# Patient Record
Sex: MALE | NOT HISPANIC OR LATINO | Employment: UNEMPLOYED | URBAN - METROPOLITAN AREA
[De-identification: names, ages, dates, MRNs, and addresses within clinical notes are randomized per-mention and may not be internally consistent; named-entity substitution may affect disease eponyms.]

---

## 2019-03-18 ENCOUNTER — OFFICE VISIT (OUTPATIENT)
Dept: MEDICAL GROUP | Facility: MEDICAL CENTER | Age: 29
End: 2019-03-18

## 2019-03-18 VITALS
HEART RATE: 69 BPM | HEIGHT: 70 IN | WEIGHT: 188 LBS | BODY MASS INDEX: 26.92 KG/M2 | TEMPERATURE: 98.4 F | SYSTOLIC BLOOD PRESSURE: 118 MMHG | DIASTOLIC BLOOD PRESSURE: 72 MMHG | OXYGEN SATURATION: 98 %

## 2019-03-18 DIAGNOSIS — F32.0 CURRENT MILD EPISODE OF MAJOR DEPRESSIVE DISORDER WITHOUT PRIOR EPISODE (HCC): ICD-10-CM

## 2019-03-18 DIAGNOSIS — F90.0 ATTENTION DEFICIT HYPERACTIVITY DISORDER (ADHD), PREDOMINANTLY INATTENTIVE TYPE: ICD-10-CM

## 2019-03-18 DIAGNOSIS — F41.9 ANXIETY: ICD-10-CM

## 2019-03-18 DIAGNOSIS — Z00.00 ANNUAL PHYSICAL EXAM: ICD-10-CM

## 2019-03-18 PROCEDURE — 99385 PREV VISIT NEW AGE 18-39: CPT | Performed by: FAMILY MEDICINE

## 2019-03-18 RX ORDER — METHYLPHENIDATE HYDROCHLORIDE 10 MG/1
10 TABLET ORAL 3 TIMES DAILY
Qty: 90 TAB | Refills: 0 | Status: SHIPPED | OUTPATIENT
Start: 2019-03-18 | End: 2019-04-25 | Stop reason: SDUPTHER

## 2019-03-18 RX ORDER — ESCITALOPRAM OXALATE 10 MG/1
10 TABLET ORAL DAILY
Qty: 30 TAB | Refills: 0 | Status: SHIPPED | OUTPATIENT
Start: 2019-03-18 | End: 2019-04-25 | Stop reason: SDUPTHER

## 2019-03-18 ASSESSMENT — PATIENT HEALTH QUESTIONNAIRE - PHQ9
5. POOR APPETITE OR OVEREATING: 0 - NOT AT ALL
CLINICAL INTERPRETATION OF PHQ2 SCORE: 1
SUM OF ALL RESPONSES TO PHQ QUESTIONS 1-9: 5

## 2019-03-19 NOTE — ASSESSMENT & PLAN NOTE
Patient has a history of depression.  He states that his worst symptoms were lack of motivation, difficulty getting out of bed, suicidal thoughts, however he never acted on these thoughts.  No suicidal attempts.  No hospitalizations.  No current suicidal thoughts.  Depression is well controlled with Lexapro 10 mg daily.  He is requesting a refill to get him back to [place] in 1 month, where he lives permanently.  He is here visiting his parents.

## 2019-03-19 NOTE — ASSESSMENT & PLAN NOTE
Diagnosed in 2011 by a psychiatrist in [facility].  He has been taking Ritalin 10 mg 2-3 times daily.  He is requesting a refill.  He will be traveling back to [place] in about 1 month.

## 2019-03-19 NOTE — PROGRESS NOTES
Bruno Baires is a 29 y.o. male here for annual  HPI: Patient lives in [facility].    Attention deficit hyperactivity disorder (ADHD), predominantly inattentive type  Diagnosed in 2011 by a psychiatrist in [facility].  He has been taking Ritalin 10 mg 2-3 times daily.  He is requesting a refill.  He will be traveling back to [place] in about 1 month.    Anxiety  Patient is currently well controlled with Lexapro 10 mg daily.    Current mild episode of major depressive disorder without prior episode (HCC)  Patient has a history of depression.  He states that his worst symptoms were lack of motivation, difficulty getting out of bed, suicidal thoughts, however he never acted on these thoughts.  No suicidal attempts.  No hospitalizations.  No current suicidal thoughts.  Depression is well controlled with Lexapro 10 mg daily.  He is requesting a refill to get him back to [place] in 1 month, where he lives permanently.  He is here visiting his parents.    Current medicines (including changes today)  Current Outpatient Prescriptions   Medication Sig Dispense Refill   • escitalopram (LEXAPRO) 10 MG Tab Take 1 Tab by mouth every day. 30 Tab 0   • methylphenidate (RITALIN) 10 MG Tab Take 1 Tab by mouth 3 times a day for 30 days. 90 Tab 0     No current facility-administered medications for this visit.      He  has a past medical history of Anxiety and Tremors of nervous system.  He  has a past surgical history that includes eye surgery.  Social History   Substance Use Topics   • Smoking status: Never Smoker   • Smokeless tobacco: Never Used   • Alcohol use 1.8 oz/week     3 Cans of beer per week     Social History     Social History Narrative   • No narrative on file     Family History   Problem Relation Age of Onset   • Hyperlipidemia Mother    • Hypertension Mother    • Cancer Maternal Grandmother         Breast   • Diabetes Maternal Grandmother      Family Status   Relation Status   • Mo Alive  "  • Fa Alive   • MGMo          ROS  No chest pain, no shortness of breath  All other systems reviewed and are negative     Objective:     Blood pressure 118/72, pulse 69, temperature 36.9 °C (98.4 °F), height 1.778 m (5' 10\"), weight 85.3 kg (188 lb), SpO2 98 %. Body mass index is 26.98 kg/m².  Physical Exam:    Constitutional: Alert, no distress.  Skin: Warm, dry, good turgor, no rashes in visible areas.  Eye: Equal, round and reactive, conjunctiva clear, lids normal.  ENMT: Lips without lesions, good dentition, oropharynx clear.  Neck: Trachea midline, no masses, no thyromegaly. No cervical or supraclavicular lymphadenopathy.  Respiratory: Unlabored respiratory effort, lungs clear to auscultation, no wheezes, no ronchi.  Cardiovascular: Normal S1, S2, no murmur, no edema.  Abdomen: Soft, non-tender, no masses, no hepatosplenomegaly.  Psych: Alert and oriented x3, normal affect and mood.        Assessment and Plan:   The following treatment plan was discussed    1. Annual physical exam  Patient believes he is up-to-date on his immunizations but will check with his primary care provider in [place].    2. Current mild episode of major depressive disorder without prior episode (HCC)  Controlled.  Refill Lexapro 10 mg daily.    3. Anxiety  Controlled.  Refill Lexapro 10 mg daily.  - escitalopram (LEXAPRO) 10 MG Tab; Take 1 Tab by mouth every day.  Dispense: 30 Tab; Refill: 0    4. Attention deficit hyperactivity disorder (ADHD), predominantly inattentive type  Controlled.  Refill Ritalin 10 mg up to 3 times daily.  - methylphenidate (RITALIN) 10 MG Tab; Take 1 Tab by mouth 3 times a day for 30 days.  Dispense: 90 Tab; Refill: 0      Followup: Return in about 1 year (around 3/18/2020) for Annual.           "

## 2019-04-25 ENCOUNTER — OFFICE VISIT (OUTPATIENT)
Dept: MEDICAL GROUP | Facility: MEDICAL CENTER | Age: 29
End: 2019-04-25

## 2019-04-25 VITALS
OXYGEN SATURATION: 94 % | BODY MASS INDEX: 26.92 KG/M2 | WEIGHT: 188 LBS | SYSTOLIC BLOOD PRESSURE: 128 MMHG | HEIGHT: 70 IN | DIASTOLIC BLOOD PRESSURE: 74 MMHG | TEMPERATURE: 97.9 F | HEART RATE: 76 BPM

## 2019-04-25 DIAGNOSIS — F90.0 ATTENTION DEFICIT HYPERACTIVITY DISORDER (ADHD), PREDOMINANTLY INATTENTIVE TYPE: ICD-10-CM

## 2019-04-25 DIAGNOSIS — F41.9 ANXIETY: ICD-10-CM

## 2019-04-25 DIAGNOSIS — F51.01 PRIMARY INSOMNIA: ICD-10-CM

## 2019-04-25 PROCEDURE — 99213 OFFICE O/P EST LOW 20 MIN: CPT | Performed by: FAMILY MEDICINE

## 2019-04-25 RX ORDER — METHYLPHENIDATE HYDROCHLORIDE 10 MG/1
10 TABLET ORAL 3 TIMES DAILY
Qty: 90 TAB | Refills: 0 | Status: SHIPPED | OUTPATIENT
Start: 2019-04-25 | End: 2019-04-25 | Stop reason: SDUPTHER

## 2019-04-25 RX ORDER — DIAZEPAM 5 MG/1
5 TABLET ORAL
Qty: 30 TAB | Refills: 5 | Status: SHIPPED | OUTPATIENT
Start: 2019-04-25 | End: 2019-05-25

## 2019-04-25 RX ORDER — ZOLPIDEM TARTRATE 5 MG/1
5 TABLET ORAL NIGHTLY PRN
Qty: 30 TAB | Refills: 5 | Status: SHIPPED | OUTPATIENT
Start: 2019-04-25 | End: 2019-05-25

## 2019-04-25 RX ORDER — METHYLPHENIDATE HYDROCHLORIDE 10 MG/1
10 TABLET ORAL 3 TIMES DAILY
Qty: 90 TAB | Refills: 0 | Status: SHIPPED | OUTPATIENT
Start: 2019-05-23 | End: 2019-04-25 | Stop reason: SDUPTHER

## 2019-04-25 RX ORDER — ESCITALOPRAM OXALATE 10 MG/1
10 TABLET ORAL DAILY
Qty: 90 TAB | Refills: 1 | Status: SHIPPED | OUTPATIENT
Start: 2019-04-25 | End: 2020-02-03 | Stop reason: SDUPTHER

## 2019-04-25 RX ORDER — METHYLPHENIDATE HYDROCHLORIDE 10 MG/1
10 TABLET ORAL 3 TIMES DAILY
Qty: 90 TAB | Refills: 0 | Status: SHIPPED | OUTPATIENT
Start: 2019-06-20 | End: 2019-07-20

## 2019-04-25 NOTE — PROGRESS NOTES
"Subjective:   Bruno Baires is a 29 y.o. male here today for anxiety and ADHD    Patient is having increased anxiety because he is currently looking for a new job.  Is a job interview in a week to possibly get a job in [facility].  Patient is requesting a refill on his Lexapro, diazepam and Ambien to help him sleep.  He is also requesting a refill on Ritalin that he has been using 2-3 times per day.    Current medicines (including changes today)  Current Outpatient Prescriptions   Medication Sig Dispense Refill   • escitalopram (LEXAPRO) 10 MG Tab Take 1 Tab by mouth every day. 90 Tab 1   • zolpidem (AMBIEN) 5 MG Tab Take 1 Tab by mouth at bedtime as needed for Sleep for up to 30 days. 30 Tab 5   • [START ON 6/20/2019] methylphenidate (RITALIN) 10 MG Tab Take 1 Tab by mouth 3 times a day for 30 days. 90 Tab 0   • diazePAM (VALIUM) 5 MG Tab Take 1 Tab by mouth 1 time daily as needed for Anxiety for up to 30 days. 30 Tab 5     No current facility-administered medications for this visit.      He  has a past medical history of Anxiety and Tremors of nervous system.    ROS   No fever       Objective:     /74 (BP Location: Right arm, Patient Position: Sitting)   Pulse 76   Temp 36.6 °C (97.9 °F)   Ht 1.778 m (5' 10\")   Wt 85.3 kg (188 lb)   SpO2 94%  Body mass index is 26.98 kg/m².   Physical Exam:  Constitutional: Alert, no distress.  Skin: Warm, dry, good turgor, no rashes in visible areas.  Eye: Equal, round and reactive, conjunctiva clear, lids normal.  Psych: Alert and oriented x3, normal affect and mood.        Assessment and Plan:   The following treatment plan was discussed    1. Attention deficit hyperactivity disorder (ADHD), predominantly inattentive type  Stable.  Refill Ritalin for 3 months.  Follow-up if he is still living in the area.  - methylphenidate (RITALIN) 10 MG Tab; Take 1 Tab by mouth 3 times a day for 30 days.  Dispense: 90 Tab; Refill: 0    2. Anxiety  Uncontrolled.  Continue " Lexapro and we will refill diazepam.  - escitalopram (LEXAPRO) 10 MG Tab; Take 1 Tab by mouth every day.  Dispense: 90 Tab; Refill: 1  - diazePAM (VALIUM) 5 MG Tab; Take 1 Tab by mouth 1 time daily as needed for Anxiety for up to 30 days.  Dispense: 30 Tab; Refill: 5    3. Primary insomnia  Uncontrolled.  Restart Ambien.  - zolpidem (AMBIEN) 5 MG Tab; Take 1 Tab by mouth at bedtime as needed for Sleep for up to 30 days.  Dispense: 30 Tab; Refill: 5      Followup: Return if symptoms worsen or fail to improve.

## 2019-11-10 ENCOUNTER — OFFICE VISIT (OUTPATIENT)
Dept: URGENT CARE | Facility: CLINIC | Age: 29
End: 2019-11-10

## 2019-11-10 VITALS
SYSTOLIC BLOOD PRESSURE: 120 MMHG | WEIGHT: 174.6 LBS | HEART RATE: 100 BPM | TEMPERATURE: 98.7 F | BODY MASS INDEX: 25.05 KG/M2 | OXYGEN SATURATION: 98 % | DIASTOLIC BLOOD PRESSURE: 82 MMHG | RESPIRATION RATE: 12 BRPM

## 2019-11-10 DIAGNOSIS — R05.9 COUGH: ICD-10-CM

## 2019-11-10 DIAGNOSIS — J01.90 ACUTE BACTERIAL SINUSITIS: ICD-10-CM

## 2019-11-10 DIAGNOSIS — B96.89 ACUTE BACTERIAL SINUSITIS: ICD-10-CM

## 2019-11-10 PROCEDURE — 99214 OFFICE O/P EST MOD 30 MIN: CPT | Performed by: NURSE PRACTITIONER

## 2019-11-10 RX ORDER — AMOXICILLIN AND CLAVULANATE POTASSIUM 875; 125 MG/1; MG/1
1 TABLET, FILM COATED ORAL 2 TIMES DAILY
Qty: 14 TAB | Refills: 0 | Status: SHIPPED | OUTPATIENT
Start: 2019-11-10 | End: 2019-11-17

## 2019-11-10 RX ORDER — BENZONATATE 100 MG/1
100 CAPSULE ORAL 3 TIMES DAILY PRN
Qty: 60 CAP | Refills: 0 | Status: SHIPPED
Start: 2019-11-10 | End: 2020-02-03

## 2019-11-10 ASSESSMENT — ENCOUNTER SYMPTOMS
COUGH: 1
HEADACHES: 1
CHILLS: 1
SINUS PRESSURE: 1
SINUS PAIN: 1
SORE THROAT: 1

## 2019-11-10 NOTE — PROGRESS NOTES
Subjective:      Bruno Baires is a 29 y.o. male who presents with URI (x 1 WEEK RESPIRTORY INFECTION )            Sinus Problem   This is a new problem. Episode onset: Pt is accompanied by his mother. Pt reports new onset of sinus congestion, sinus pain and coughing that started 4 days ago. He states symptoms are getting much worse despite OTC therapies. No recent fever, feels chills. The problem has been gradually worsening since onset. Associated symptoms include chills, congestion, coughing, headaches, sinus pressure and a sore throat. Past treatments include oral decongestants and spray decongestants. The treatment provided mild relief.       Review of Systems   Constitutional: Positive for chills.   HENT: Positive for congestion, sinus pressure, sinus pain and sore throat.    Respiratory: Positive for cough.    Neurological: Positive for headaches.   All other systems reviewed and are negative.    Past Medical History:   Diagnosis Date   • Anxiety    • Tremors of nervous system       Past Surgical History:   Procedure Laterality Date   • EYE SURGERY      PRK x2 in each eye      Social History     Socioeconomic History   • Marital status: Single     Spouse name: Not on file   • Number of children: Not on file   • Years of education: Not on file   • Highest education level: Not on file   Occupational History   • Not on file   Social Needs   • Financial resource strain: Not on file   • Food insecurity:     Worry: Not on file     Inability: Not on file   • Transportation needs:     Medical: Not on file     Non-medical: Not on file   Tobacco Use   • Smoking status: Never Smoker   • Smokeless tobacco: Never Used   Substance and Sexual Activity   • Alcohol use: Yes     Alcohol/week: 1.8 oz     Types: 3 Cans of beer per week   • Drug use: No   • Sexual activity: Not Currently   Lifestyle   • Physical activity:     Days per week: Not on file     Minutes per session: Not on file   • Stress: Not on file   Relationships    • Social connections:     Talks on phone: Not on file     Gets together: Not on file     Attends Worship service: Not on file     Active member of club or organization: Not on file     Attends meetings of clubs or organizations: Not on file     Relationship status: Not on file   • Intimate partner violence:     Fear of current or ex partner: Not on file     Emotionally abused: Not on file     Physically abused: Not on file     Forced sexual activity: Not on file   Other Topics Concern   • Not on file   Social History Narrative   • Not on file          Objective:     /82 (BP Location: Left arm, Patient Position: Sitting, BP Cuff Size: Adult)   Pulse 100   Temp 37.1 °C (98.7 °F) (Temporal)   Resp 12   Wt 79.2 kg (174 lb 9.6 oz)   SpO2 98%   BMI 25.05 kg/m²      Physical Exam  Vitals signs and nursing note reviewed.   Constitutional:       General: He is awake.      Appearance: Normal appearance. He is normal weight.   HENT:      Head: Normocephalic and atraumatic.      Right Ear: Tympanic membrane and external ear normal.      Left Ear: Tympanic membrane and external ear normal.      Nose: Congestion and rhinorrhea present.      Right Sinus: Maxillary sinus tenderness present.      Left Sinus: Maxillary sinus tenderness present.      Mouth/Throat:      Mouth: Mucous membranes are moist.      Pharynx: Posterior oropharyngeal erythema present.   Eyes:      Extraocular Movements: Extraocular movements intact.      Pupils: Pupils are equal, round, and reactive to light.   Neck:      Musculoskeletal: Normal range of motion.   Cardiovascular:      Rate and Rhythm: Normal rate and regular rhythm.   Pulmonary:      Effort: Pulmonary effort is normal.      Breath sounds: Normal breath sounds.   Musculoskeletal: Normal range of motion.   Lymphadenopathy:      Head:      Right side of head: Submandibular adenopathy present.      Left side of head: Submandibular adenopathy present.   Skin:     General: Skin is  warm and dry.      Capillary Refill: Capillary refill takes less than 2 seconds.   Neurological:      General: No focal deficit present.      Mental Status: He is alert and oriented to person, place, and time. Mental status is at baseline.   Psychiatric:         Mood and Affect: Mood normal.         Speech: Speech normal.         Behavior: Behavior is cooperative.         Thought Content: Thought content normal.         Judgment: Judgment normal.                 Assessment/Plan:       1. Acute bacterial sinusitis  - amoxicillin-clavulanate (AUGMENTIN) 875-125 MG Tab; Take 1 Tab by mouth 2 times a day for 7 days.  Dispense: 14 Tab; Refill: 0    2. Cough  - benzonatate (TESSALON) 100 MG Cap; Take 1 Cap by mouth 3 times a day as needed for Cough.  Dispense: 60 Cap; Refill: 0    Take full course of abx  Continue OTC nasal decongestant spray as directed  Increase water intake  Tylenol and ibuprofen as needed for pain  Encouraged rest and supportive care  Supportive care, differential diagnoses, and indications for immediate follow-up discussed with patient.    Pathogenesis of diagnosis discussed including typical length and natural progression.      Instructed to return to  or nearest emergency department if symptoms fail to improve, for any change in condition, further concerns, or new concerning symptoms.  Patient states understanding of the plan of care and discharge instructions.    **Of note, mother was very upset at beginning of visit due to a mix up with patient names in the waiting room and whether or not her son had an appt or was a walk-in. I apologized several times and referred her to [facility] if she wants to make a complaint. She seemed happy at the end of the visit.

## 2019-11-13 ENCOUNTER — TELEPHONE (OUTPATIENT)
Dept: MEDICAL GROUP | Facility: MEDICAL CENTER | Age: 29
End: 2019-11-13

## 2019-11-13 ENCOUNTER — OFFICE VISIT (OUTPATIENT)
Dept: MEDICAL GROUP | Facility: MEDICAL CENTER | Age: 29
End: 2019-11-13

## 2019-11-13 VITALS
SYSTOLIC BLOOD PRESSURE: 120 MMHG | DIASTOLIC BLOOD PRESSURE: 72 MMHG | BODY MASS INDEX: 24.62 KG/M2 | OXYGEN SATURATION: 95 % | HEIGHT: 70 IN | WEIGHT: 172 LBS | HEART RATE: 79 BPM | TEMPERATURE: 97.7 F

## 2019-11-13 DIAGNOSIS — F90.0 ATTENTION DEFICIT HYPERACTIVITY DISORDER (ADHD), PREDOMINANTLY INATTENTIVE TYPE: ICD-10-CM

## 2019-11-13 PROBLEM — J01.10 ACUTE NON-RECURRENT FRONTAL SINUSITIS: Status: ACTIVE | Noted: 2019-11-13

## 2019-11-13 PROCEDURE — 99213 OFFICE O/P EST LOW 20 MIN: CPT | Performed by: FAMILY MEDICINE

## 2019-11-13 RX ORDER — METHYLPHENIDATE HYDROCHLORIDE 10 MG/1
10 TABLET ORAL 2 TIMES DAILY
Qty: 60 TAB | Refills: 0 | Status: SHIPPED | OUTPATIENT
Start: 2019-11-13 | End: 2019-11-13 | Stop reason: SDUPTHER

## 2019-11-13 RX ORDER — METHYLPHENIDATE HYDROCHLORIDE 10 MG/1
10 TABLET ORAL 3 TIMES DAILY
Qty: 90 TAB | Refills: 0 | Status: SHIPPED | OUTPATIENT
Start: 2020-01-08 | End: 2020-02-03 | Stop reason: SDUPTHER

## 2019-11-13 RX ORDER — METHYLPHENIDATE HYDROCHLORIDE 10 MG/1
10 TABLET ORAL 3 TIMES DAILY
Qty: 90 TAB | Refills: 0 | Status: SHIPPED | OUTPATIENT
Start: 2019-11-13 | End: 2019-11-13 | Stop reason: SDUPTHER

## 2019-11-13 RX ORDER — METHYLPHENIDATE HYDROCHLORIDE 10 MG/1
10 TABLET ORAL 3 TIMES DAILY
Qty: 90 TAB | Refills: 0 | Status: SHIPPED | OUTPATIENT
Start: 2019-12-11 | End: 2019-11-13 | Stop reason: SDUPTHER

## 2019-11-13 NOTE — PROGRESS NOTES
"Subjective:   Bruno Baires is a 29 y.o. male here today for ADHD    Patient is taking Ritalin 10 mg 1-2 times daily most days. He is trying to wean down if he does not need to use the medication. He just completed his school in [facility].  Patient will now be looking for a job.  He will be in the [place] with his parents for at least the next 3 months.    Current medicines (including changes today)  Current Outpatient Medications   Medication Sig Dispense Refill   • [START ON 1/8/2020] methylphenidate (RITALIN) 10 MG Tab Take 1 Tab by mouth 3 times a day for 30 days. 90 Tab 0   • amoxicillin-clavulanate (AUGMENTIN) 875-125 MG Tab Take 1 Tab by mouth 2 times a day for 7 days. 14 Tab 0   • benzonatate (TESSALON) 100 MG Cap Take 1 Cap by mouth 3 times a day as needed for Cough. 60 Cap 0   • escitalopram (LEXAPRO) 10 MG Tab Take 1 Tab by mouth every day. 90 Tab 1     No current facility-administered medications for this visit.      He  has a past medical history of Anxiety and Tremors of nervous system.    ROS   No weight loss       Objective:     /72 (BP Location: Right arm, Patient Position: Sitting)   Pulse 79   Temp 36.5 °C (97.7 °F)   Ht 1.778 m (5' 10\")   Wt 78 kg (172 lb)   SpO2 95%  Body mass index is 24.68 kg/m².   Physical Exam:  Constitutional: Alert, no distress.  Skin: Warm, dry, good turgor, no rashes in visible areas.  Eye: Equal, round and reactive, conjunctiva clear, lids normal.  Psych: Alert and oriented x3, normal affect and mood.        Assessment and Plan:   The following treatment plan was discussed    1. Attention deficit hyperactivity disorder (ADHD), predominantly inattentive type  Controlled.  Refill Ritalin 10 mg up to 3 times daily.  Encouraged patient to minimize dose as much as possible.  - methylphenidate (RITALIN) 10 MG Tab; Take 1 Tab by mouth 3 times a day for 30 days.  Dispense: 90 Tab; Refill: 0      Followup: Return if symptoms worsen or fail to improve.         "

## 2019-12-05 DIAGNOSIS — F51.01 PRIMARY INSOMNIA: ICD-10-CM

## 2019-12-05 DIAGNOSIS — F41.9 ANXIETY: ICD-10-CM

## 2019-12-05 RX ORDER — ZOLPIDEM TARTRATE 5 MG/1
5 TABLET ORAL NIGHTLY PRN
Qty: 30 TAB | Refills: 5 | Status: SHIPPED
Start: 2019-12-05 | End: 2020-02-03 | Stop reason: SDUPTHER

## 2019-12-05 RX ORDER — DIAZEPAM 5 MG/1
5 TABLET ORAL
Qty: 30 TAB | Refills: 5 | Status: SHIPPED
Start: 2019-12-05 | End: 2020-01-04

## 2019-12-29 ENCOUNTER — OFFICE VISIT (OUTPATIENT)
Dept: URGENT CARE | Facility: MEDICAL CENTER | Age: 29
End: 2019-12-29

## 2019-12-29 VITALS
HEIGHT: 70 IN | OXYGEN SATURATION: 99 % | HEART RATE: 69 BPM | TEMPERATURE: 99 F | SYSTOLIC BLOOD PRESSURE: 118 MMHG | DIASTOLIC BLOOD PRESSURE: 68 MMHG | BODY MASS INDEX: 25.48 KG/M2 | RESPIRATION RATE: 16 BRPM | WEIGHT: 178 LBS

## 2019-12-29 DIAGNOSIS — S41.112A LACERATION OF MULTIPLE SITES OF LEFT UPPER EXTREMITY, INITIAL ENCOUNTER: ICD-10-CM

## 2019-12-29 PROCEDURE — 99214 OFFICE O/P EST MOD 30 MIN: CPT | Performed by: NURSE PRACTITIONER

## 2019-12-29 RX ORDER — CEPHALEXIN 500 MG/1
500 CAPSULE ORAL 4 TIMES DAILY
Qty: 28 CAP | Refills: 0 | Status: SHIPPED | OUTPATIENT
Start: 2019-12-29 | End: 2020-01-05

## 2019-12-29 ASSESSMENT — ENCOUNTER SYMPTOMS
SENSORY CHANGE: 0
FEVER: 0
TINGLING: 0
WEAKNESS: 0
BRUISES/BLEEDS EASILY: 0
MYALGIAS: 0
CHILLS: 0

## 2019-12-30 NOTE — PROGRESS NOTES
Subjective:      Bruno Baires is a 29 y.o. male who presents with Laceration (fence cut left arm, x3days, wants to make sure not infected)            HPI  States cut his left forearm on a wire fence 3 days ago. Cleaning with soap/water and neosporin. States UTD with tetanus in last 1.5 years. Denies fever, malaise, or red streaking up arm. Redness without swelling or d/c from site. Denies numbness/tingling of left forearm.    PMH:  has a past medical history of Anxiety and Tremors of nervous system.  MEDS:   Current Outpatient Medications:   •  mupirocin (BACTROBAN) 2 % Ointment, Apply 1 Application to affected area(s) 2 times a day for 7 days., Disp: 60 g, Rfl: 0  •  cephALEXin (KEFLEX) 500 MG Cap, Take 1 Cap by mouth 4 times a day for 7 days., Disp: 28 Cap, Rfl: 0  •  diazePAM (VALIUM) 5 MG Tab, Take 1 Tab by mouth 1 time daily as needed for Anxiety for up to 30 days., Disp: 30 Tab, Rfl: 5  •  zolpidem (AMBIEN) 5 MG Tab, Take 1 Tab by mouth at bedtime as needed for Sleep for up to 30 days., Disp: 30 Tab, Rfl: 5  •  [START ON 1/8/2020] methylphenidate (RITALIN) 10 MG Tab, Take 1 Tab by mouth 3 times a day for 30 days., Disp: 90 Tab, Rfl: 0  •  benzonatate (TESSALON) 100 MG Cap, Take 1 Cap by mouth 3 times a day as needed for Cough., Disp: 60 Cap, Rfl: 0  •  escitalopram (LEXAPRO) 10 MG Tab, Take 1 Tab by mouth every day., Disp: 90 Tab, Rfl: 1  ALLERGIES: No Known Allergies  SURGHX:   Past Surgical History:   Procedure Laterality Date   • EYE SURGERY      PRK x2 in each eye     SOCHX:  reports that he has never smoked. He has never used smokeless tobacco. He reports current alcohol use of about 1.8 oz of alcohol per week. He reports that he does not use drugs.  FH: Family history was reviewed, no pertinent findings to report    Review of Systems   Constitutional: Negative for chills, fever and malaise/fatigue.   Musculoskeletal: Negative for joint pain and myalgias.   Skin: Negative for itching and rash.  "  Neurological: Negative for tingling, sensory change and weakness.   Endo/Heme/Allergies: Does not bruise/bleed easily.   All other systems reviewed and are negative.         Objective:     /68   Pulse 69   Temp 37.2 °C (99 °F) (Temporal)   Resp 16   Ht 1.778 m (5' 10\")   Wt 80.7 kg (178 lb)   SpO2 99%   BMI 25.54 kg/m²      Physical Exam  Vitals signs reviewed.   Constitutional:       General: He is awake. He is not in acute distress.     Appearance: Normal appearance. He is well-developed. He is not ill-appearing, toxic-appearing or diaphoretic.   HENT:      Head: Normocephalic.   Cardiovascular:      Rate and Rhythm: Normal rate.   Pulmonary:      Effort: Pulmonary effort is normal. No accessory muscle usage or respiratory distress.   Musculoskeletal:         General: Tenderness present. No deformity.      Left forearm: He exhibits laceration. He exhibits no tenderness, no bony tenderness, no swelling, no edema and no deformity.   Skin:     General: Skin is warm and dry.      Findings: Erythema, signs of injury, laceration and wound present. No abrasion, abscess, bruising, ecchymosis or rash.      Comments: Angela cross scratches to left forearm extending the length of forearm but without elbow or wrist involvement.. Some tissue granulation seen in wound site. No swelling, bruising or skin discoloration, no d/c from site. Mild redness at edges of wound which appear to be normal wound healing. No red streaking of upper arm. FROM of elbow and wrist, no wrist/hand involvement. MA cleaned site with dilute hibiclens and saline, TAB ointment and nonadhering bandage and gauze.   Neurological:      Mental Status: He is alert and oriented to person, place, and time.   Psychiatric:         Behavior: Behavior is cooperative.                 Assessment/Plan:       1. Laceration of multiple sites of left upper extremity, initial encounter    - mupirocin (BACTROBAN) 2 % Ointment; Apply 1 Application to affected " area(s) 2 times a day for 7 days.  Dispense: 60 g; Refill: 0  - cephALEXin (KEFLEX) 500 MG Cap; Take 1 Cap by mouth 4 times a day for 7 days.  Dispense: 28 Cap; Refill: 0    May apply cool compress to area for any swelling   Keep area clean with mild soap and tepid water, pat dry  May cover loosely with bandage to prevent dirt or debris into wounds, as discussed  Monitor for skin infection with increased swelling, redness, pain, d/c, fever, malaise, red streaking-recheck  Return prn

## 2020-02-03 ENCOUNTER — OFFICE VISIT (OUTPATIENT)
Dept: MEDICAL GROUP | Facility: MEDICAL CENTER | Age: 30
End: 2020-02-03

## 2020-02-03 VITALS
TEMPERATURE: 98.4 F | SYSTOLIC BLOOD PRESSURE: 112 MMHG | BODY MASS INDEX: 24.62 KG/M2 | WEIGHT: 172 LBS | HEART RATE: 100 BPM | OXYGEN SATURATION: 95 % | HEIGHT: 70 IN | DIASTOLIC BLOOD PRESSURE: 72 MMHG

## 2020-02-03 DIAGNOSIS — F51.01 PRIMARY INSOMNIA: ICD-10-CM

## 2020-02-03 DIAGNOSIS — F41.9 ANXIETY: ICD-10-CM

## 2020-02-03 DIAGNOSIS — F90.0 ATTENTION DEFICIT HYPERACTIVITY DISORDER (ADHD), PREDOMINANTLY INATTENTIVE TYPE: ICD-10-CM

## 2020-02-03 PROCEDURE — 99213 OFFICE O/P EST LOW 20 MIN: CPT | Performed by: FAMILY MEDICINE

## 2020-02-03 RX ORDER — ESCITALOPRAM OXALATE 10 MG/1
10 TABLET ORAL DAILY
Qty: 90 TAB | Refills: 3 | Status: SHIPPED | OUTPATIENT
Start: 2020-02-03 | End: 2020-02-03 | Stop reason: SDUPTHER

## 2020-02-03 RX ORDER — ZOLPIDEM TARTRATE 10 MG/1
10 TABLET ORAL NIGHTLY PRN
Qty: 90 TAB | Refills: 1 | Status: SHIPPED | OUTPATIENT
Start: 2020-02-03 | End: 2020-05-03

## 2020-02-03 RX ORDER — METHYLPHENIDATE HYDROCHLORIDE 10 MG/1
10 TABLET ORAL 3 TIMES DAILY
Qty: 90 TAB | Refills: 0 | Status: SHIPPED | OUTPATIENT
Start: 2020-03-02 | End: 2020-02-03 | Stop reason: SDUPTHER

## 2020-02-03 RX ORDER — ESCITALOPRAM OXALATE 10 MG/1
10 TABLET ORAL DAILY
Qty: 90 TAB | Refills: 3 | Status: SHIPPED | OUTPATIENT
Start: 2020-02-03 | End: 2020-11-24 | Stop reason: SDUPTHER

## 2020-02-03 RX ORDER — METHYLPHENIDATE HYDROCHLORIDE 10 MG/1
10 TABLET ORAL 3 TIMES DAILY
Qty: 90 TAB | Refills: 0 | Status: SHIPPED | OUTPATIENT
Start: 2020-02-03 | End: 2020-02-03 | Stop reason: SDUPTHER

## 2020-02-03 RX ORDER — METHYLPHENIDATE HYDROCHLORIDE 10 MG/1
10 TABLET ORAL 3 TIMES DAILY
Qty: 90 TAB | Refills: 0 | Status: SHIPPED | OUTPATIENT
Start: 2020-03-30 | End: 2020-11-24 | Stop reason: SDUPTHER

## 2020-02-03 NOTE — PROGRESS NOTES
"Subjective:   Bruno Baires is a 29 y.o. male here today for ADHD    Attention deficit hyperactivity disorder (ADHD), predominantly inattentive type  Diagnosed in 2011 by a psychiatrist in [facility].  He has been taking Ritalin 10 mg 2-3 times daily.  He is requesting a refill.  He will be taking a road trip to [facility] soon.  He has a job in [facility] starting in June.    Anxiety  Patient is doing well.  He is tolerating Lexapro 10 mg daily.    Primary insomnia  Patient is tolerating Ambien 10 mg, half tablet, nightly.         Current medicines (including changes today)  Current Outpatient Medications   Medication Sig Dispense Refill   • [START ON 3/30/2020] methylphenidate (RITALIN) 10 MG Tab Take 1 Tab by mouth 3 times a day for 30 days. 90 Tab 0   • zolpidem (AMBIEN) 10 MG Tab Take 1 Tab by mouth at bedtime as needed for Sleep for up to 90 days. 90 Tab 1   • escitalopram (LEXAPRO) 10 MG Tab Take 1 Tab by mouth every day. 90 Tab 3     No current facility-administered medications for this visit.      He  has a past medical history of Anxiety and Tremors of nervous system.    ROS   No fever       Objective:     /72 (BP Location: Right arm, Patient Position: Sitting)   Pulse 100   Temp 36.9 °C (98.4 °F) (Temporal)   Ht 1.778 m (5' 10\")   Wt 78 kg (172 lb)   SpO2 95%  Body mass index is 24.68 kg/m².   Physical Exam:  Constitutional: Alert, no distress.  Skin: Warm, dry, good turgor, no rashes in visible areas.  Eye: Equal, round and reactive, conjunctiva clear, lids normal.  Psych: Alert and oriented x3, normal affect and mood.        Assessment and Plan:   The following treatment plan was discussed    1. Attention deficit hyperactivity disorder (ADHD), predominantly inattentive type  Controlled.  Continue Ritalin 10 mg 3 times daily.  Follow-up before leaving to [facility] for additional refills.  - methylphenidate (RITALIN) 10 MG Tab; Take 1 Tab by mouth 3 times a day for 30 " days.  Dispense: 90 Tab; Refill: 0    2. Anxiety  Controlled.  Continue Lexapro.  - escitalopram (LEXAPRO) 10 MG Tab; Take 1 Tab by mouth every day.  Dispense: 90 Tab; Refill: 3    3. Primary insomnia  Controlled.  Continue Ambien.  - zolpidem (AMBIEN) 10 MG Tab; Take 1 Tab by mouth at bedtime as needed for Sleep for up to 90 days.  Dispense: 90 Tab; Refill: 1      Followup: Return in about 17 weeks (around 6/1/2020) for ADHD.

## 2020-02-03 NOTE — ASSESSMENT & PLAN NOTE
Diagnosed in 2011 by a psychiatrist in [facility].  He has been taking Ritalin 10 mg 2-3 times daily.  He is requesting a refill.  He will be taking a road trip to [facility] soon.  He has a job in [facility] starting in June.

## 2020-06-17 DIAGNOSIS — F51.01 PRIMARY INSOMNIA: ICD-10-CM

## 2020-06-17 RX ORDER — ZOLPIDEM TARTRATE 5 MG/1
TABLET ORAL
Qty: 30 TAB | Refills: 5 | Status: SHIPPED | OUTPATIENT
Start: 2020-06-17 | End: 2020-11-24 | Stop reason: SDUPTHER

## 2020-06-17 NOTE — TELEPHONE ENCOUNTER
Received request via: Pharmacy    Was the patient seen in the last year in this department? Yes 2/3/2020    Does the patient have an active prescription (recently filled or refills available) for medication(s) requested? No

## 2020-11-24 ENCOUNTER — OFFICE VISIT (OUTPATIENT)
Dept: MEDICAL GROUP | Facility: MEDICAL CENTER | Age: 30
End: 2020-11-24

## 2020-11-24 VITALS
TEMPERATURE: 98.2 F | DIASTOLIC BLOOD PRESSURE: 74 MMHG | SYSTOLIC BLOOD PRESSURE: 116 MMHG | HEIGHT: 70 IN | OXYGEN SATURATION: 97 % | BODY MASS INDEX: 22.9 KG/M2 | HEART RATE: 104 BPM | WEIGHT: 160 LBS

## 2020-11-24 DIAGNOSIS — F41.9 ANXIETY: ICD-10-CM

## 2020-11-24 DIAGNOSIS — L65.9 HAIR THINNING: ICD-10-CM

## 2020-11-24 DIAGNOSIS — F90.0 ATTENTION DEFICIT HYPERACTIVITY DISORDER (ADHD), PREDOMINANTLY INATTENTIVE TYPE: ICD-10-CM

## 2020-11-24 DIAGNOSIS — F51.01 PRIMARY INSOMNIA: ICD-10-CM

## 2020-11-24 PROCEDURE — 99213 OFFICE O/P EST LOW 20 MIN: CPT | Performed by: FAMILY MEDICINE

## 2020-11-24 RX ORDER — METHYLPHENIDATE HYDROCHLORIDE 10 MG/1
10 TABLET ORAL 3 TIMES DAILY
Qty: 90 TAB | Refills: 0 | Status: SHIPPED | OUTPATIENT
Start: 2021-01-19 | End: 2021-02-18

## 2020-11-24 RX ORDER — ESCITALOPRAM OXALATE 10 MG/1
10 TABLET ORAL DAILY
Qty: 90 TAB | Refills: 3 | Status: SHIPPED | OUTPATIENT
Start: 2020-11-24

## 2020-11-24 RX ORDER — ZOLPIDEM TARTRATE 10 MG/1
TABLET ORAL
Qty: 30 TAB | Refills: 5 | Status: SHIPPED | OUTPATIENT
Start: 2020-11-24 | End: 2020-11-24 | Stop reason: SDUPTHER

## 2020-11-24 RX ORDER — METHYLPHENIDATE HYDROCHLORIDE 10 MG/1
10 TABLET ORAL 3 TIMES DAILY
Qty: 90 TAB | Refills: 0 | Status: SHIPPED | OUTPATIENT
Start: 2020-12-22 | End: 2020-11-24 | Stop reason: SDUPTHER

## 2020-11-24 RX ORDER — FINASTERIDE 5 MG/1
5 TABLET, FILM COATED ORAL DAILY
Qty: 90 TAB | Refills: 3 | Status: SHIPPED | OUTPATIENT
Start: 2020-11-24

## 2020-11-24 RX ORDER — ZOLPIDEM TARTRATE 10 MG/1
TABLET ORAL
Qty: 90 TAB | Refills: 1 | Status: SHIPPED | OUTPATIENT
Start: 2020-11-24 | End: 2021-02-22

## 2020-11-24 RX ORDER — METHYLPHENIDATE HYDROCHLORIDE 10 MG/1
10 TABLET ORAL 3 TIMES DAILY
Qty: 90 TAB | Refills: 0 | Status: SHIPPED | OUTPATIENT
Start: 2020-11-24 | End: 2020-11-24 | Stop reason: SDUPTHER

## 2020-11-24 NOTE — ASSESSMENT & PLAN NOTE
He has noticed hair thinning for the last 1 year, so has been Rogaine for the last 1 year. He has noticed more thinning over the last 1 month. He thinks it's stress related, career stress.

## 2020-11-24 NOTE — PROGRESS NOTES
"Subjective:   Bruno Baires is a 30 y.o. male here today for hair thinning    Hair thinning  He has noticed hair thinning for the last 1 year, so has been Rogaine for the last 1 year. He has noticed more thinning over the last 1 month. He thinks it's stress related, career stress.    Primary insomnia  Ambien 5 mg is not working well anymore. He uses Ambien about 4 times per week.    Attention deficit hyperactivity disorder (ADHD), predominantly inattentive type  Patient is taking Ritalin 10 mg three times daily, which is working well.    Anxiety  Patient is doing well with Lexapro 10 mg daily.  He is requesting a refill today.         Current medicines (including changes today)  Current Outpatient Medications   Medication Sig Dispense Refill   • finasteride (PROSCAR) 5 MG Tab Take 1 Tab by mouth every day. 90 Tab 3   • escitalopram (LEXAPRO) 10 MG Tab Take 1 Tab by mouth every day. 90 Tab 3   • [START ON 1/19/2021] methylphenidate (RITALIN) 10 MG Tab Take 1 Tab by mouth 3 times a day for 30 days. 90 Tab 0   • zolpidem (AMBIEN) 10 MG Tab TAKE ONE TABLET BY MOUTH EVERY NIGHT AT BEDTIME AS NEEDED FOR SLEEP FOR UP TO 90 DAYS 90 Tab 1     No current facility-administered medications for this visit.      He  has a past medical history of Anxiety and Tremors of nervous system.    ROS   No fever       Objective:     /74 (BP Location: Right arm, Patient Position: Sitting)   Pulse (!) 104   Temp 36.8 °C (98.2 °F) (Temporal)   Ht 1.778 m (5' 10\")   Wt 72.6 kg (160 lb)   SpO2 97%  Body mass index is 22.96 kg/m².   Physical Exam:  Constitutional: Alert, no distress.  Skin: Warm, dry, good turgor, no rashes in visible areas.  Eye: Equal, round and reactive, conjunctiva clear, lids normal.  Psych: Alert and oriented x3, normal affect and mood.        Assessment and Plan:   The following treatment plan was discussed    1. Primary insomnia  Slightly uncontrolled.  Will increase zolpidem from 5 mg daily to 10 mg daily.  - " zolpidem (AMBIEN) 10 MG Tab; TAKE ONE TABLET BY MOUTH EVERY NIGHT AT BEDTIME AS NEEDED FOR SLEEP FOR UP TO 30 DAYS  Dispense: 30 Tab; Refill: 5    2. Attention deficit hyperactivity disorder (ADHD), predominantly inattentive type  Controlled.  Refill methylphenidate 10 mg 3 times daily.  - methylphenidate (RITALIN) 10 MG Tab; Take 1 Tab by mouth 3 times a day for 30 days.  Dispense: 90 Tab; Refill: 0    3. Hair thinning  New problem.  Continue Rogaine and will do a trial of finasteride.  Labs ordered, call with results.  - TSH WITH REFLEX TO FT4; Future  - CBC WITH DIFFERENTIAL; Future  - Comp Metabolic Panel; Future  - finasteride (PROSCAR) 5 MG Tab; Take 1 Tab by mouth every day.  Dispense: 90 Tab; Refill: 3    4. Anxiety  Controlled.  Continue Lexapro.  - escitalopram (LEXAPRO) 10 MG Tab; Take 1 Tab by mouth every day.  Dispense: 90 Tab; Refill: 3      Followup: Return in about 3 months (around 2/24/2021) for ADHD medication refill.

## 2021-02-22 ENCOUNTER — APPOINTMENT (OUTPATIENT)
Dept: RADIOLOGY | Facility: MEDICAL CENTER | Age: 31
End: 2021-02-22
Attending: EMERGENCY MEDICINE
Payer: OTHER MISCELLANEOUS

## 2021-02-22 ENCOUNTER — HOSPITAL ENCOUNTER (OUTPATIENT)
Facility: MEDICAL CENTER | Age: 31
End: 2021-02-22
Attending: EMERGENCY MEDICINE | Admitting: STUDENT IN AN ORGANIZED HEALTH CARE EDUCATION/TRAINING PROGRAM
Payer: OTHER MISCELLANEOUS

## 2021-02-22 ENCOUNTER — TELEPHONE (OUTPATIENT)
Dept: HOSPITALIST | Facility: MEDICAL CENTER | Age: 31
End: 2021-02-22

## 2021-02-22 VITALS
OXYGEN SATURATION: 96 % | WEIGHT: 157.19 LBS | DIASTOLIC BLOOD PRESSURE: 72 MMHG | RESPIRATION RATE: 18 BRPM | BODY MASS INDEX: 22.5 KG/M2 | HEIGHT: 70 IN | TEMPERATURE: 98 F | HEART RATE: 76 BPM | SYSTOLIC BLOOD PRESSURE: 109 MMHG

## 2021-02-22 DIAGNOSIS — I95.1 ORTHOSTATIC SYNCOPE: ICD-10-CM

## 2021-02-22 PROBLEM — R55 VASOVAGAL SYNCOPE: Status: ACTIVE | Noted: 2021-02-22

## 2021-02-22 LAB
ALBUMIN SERPL BCP-MCNC: 4.6 G/DL (ref 3.2–4.9)
ALBUMIN/GLOB SERPL: 1.7 G/DL
ALP SERPL-CCNC: 64 U/L (ref 30–99)
ALT SERPL-CCNC: 9 U/L (ref 2–50)
AMPHET UR QL SCN: NEGATIVE
ANION GAP SERPL CALC-SCNC: 11 MMOL/L (ref 7–16)
APPEARANCE UR: CLEAR
AST SERPL-CCNC: 17 U/L (ref 12–45)
BARBITURATES UR QL SCN: NEGATIVE
BASOPHILS # BLD AUTO: 1.3 % (ref 0–1.8)
BASOPHILS # BLD: 0.09 K/UL (ref 0–0.12)
BENZODIAZ UR QL SCN: NEGATIVE
BILIRUB SERPL-MCNC: 0.4 MG/DL (ref 0.1–1.5)
BILIRUB UR QL STRIP.AUTO: NEGATIVE
BUN SERPL-MCNC: 14 MG/DL (ref 8–22)
BZE UR QL SCN: NEGATIVE
CALCIUM SERPL-MCNC: 9.5 MG/DL (ref 8.4–10.2)
CANNABINOIDS UR QL SCN: NEGATIVE
CHLORIDE SERPL-SCNC: 100 MMOL/L (ref 96–112)
CO2 SERPL-SCNC: 26 MMOL/L (ref 20–33)
COLOR UR: YELLOW
CORTIS SERPL-MCNC: 12.6 UG/DL (ref 0–23)
CREAT SERPL-MCNC: 0.82 MG/DL (ref 0.5–1.4)
EKG IMPRESSION: NORMAL
EOSINOPHIL # BLD AUTO: 0.54 K/UL (ref 0–0.51)
EOSINOPHIL NFR BLD: 8 % (ref 0–6.9)
ERYTHROCYTE [DISTWIDTH] IN BLOOD BY AUTOMATED COUNT: 40.7 FL (ref 35.9–50)
GLOBULIN SER CALC-MCNC: 2.7 G/DL (ref 1.9–3.5)
GLUCOSE SERPL-MCNC: 105 MG/DL (ref 65–99)
GLUCOSE UR STRIP.AUTO-MCNC: NEGATIVE MG/DL
HCT VFR BLD AUTO: 45 % (ref 42–52)
HGB BLD-MCNC: 15.3 G/DL (ref 14–18)
IMM GRANULOCYTES # BLD AUTO: 0.01 K/UL (ref 0–0.11)
IMM GRANULOCYTES NFR BLD AUTO: 0.1 % (ref 0–0.9)
KETONES UR STRIP.AUTO-MCNC: NEGATIVE MG/DL
LACTATE BLD-SCNC: 1.1 MMOL/L (ref 0.5–2)
LEUKOCYTE ESTERASE UR QL STRIP.AUTO: NEGATIVE
LIPASE SERPL-CCNC: 25 U/L (ref 7–58)
LYMPHOCYTES # BLD AUTO: 1.62 K/UL (ref 1–4.8)
LYMPHOCYTES NFR BLD: 24 % (ref 22–41)
MCH RBC QN AUTO: 29.1 PG (ref 27–33)
MCHC RBC AUTO-ENTMCNC: 34 G/DL (ref 33.7–35.3)
MCV RBC AUTO: 85.6 FL (ref 81.4–97.8)
METHADONE UR QL SCN: NEGATIVE
MICRO URNS: NORMAL
MONOCYTES # BLD AUTO: 0.66 K/UL (ref 0–0.85)
MONOCYTES NFR BLD AUTO: 9.8 % (ref 0–13.4)
NEUTROPHILS # BLD AUTO: 3.82 K/UL (ref 1.82–7.42)
NEUTROPHILS NFR BLD: 56.8 % (ref 44–72)
NITRITE UR QL STRIP.AUTO: NEGATIVE
NRBC # BLD AUTO: 0 K/UL
NRBC BLD-RTO: 0 /100 WBC
OPIATES UR QL SCN: NEGATIVE
OXYCODONE UR QL SCN: NEGATIVE
PCP UR QL SCN: NEGATIVE
PH UR STRIP.AUTO: 6.5 [PH] (ref 5–8)
PLATELET # BLD AUTO: 229 K/UL (ref 164–446)
PMV BLD AUTO: 9.4 FL (ref 9–12.9)
POTASSIUM SERPL-SCNC: 4.5 MMOL/L (ref 3.6–5.5)
PROPOXYPH UR QL SCN: NEGATIVE
PROT SERPL-MCNC: 7.3 G/DL (ref 6–8.2)
PROT UR QL STRIP: NEGATIVE MG/DL
RBC # BLD AUTO: 5.26 M/UL (ref 4.7–6.1)
RBC UR QL AUTO: NEGATIVE
SARS-COV-2 RNA RESP QL NAA+PROBE: NOTDETECTED
SODIUM SERPL-SCNC: 137 MMOL/L (ref 135–145)
SP GR UR STRIP.AUTO: 1.01
SPECIMEN SOURCE: NORMAL
TROPONIN T SERPL-MCNC: <6 NG/L (ref 6–19)
WBC # BLD AUTO: 6.7 K/UL (ref 4.8–10.8)

## 2021-02-22 PROCEDURE — 82533 TOTAL CORTISOL: CPT

## 2021-02-22 PROCEDURE — 99284 EMERGENCY DEPT VISIT MOD MDM: CPT

## 2021-02-22 PROCEDURE — 81003 URINALYSIS AUTO W/O SCOPE: CPT

## 2021-02-22 PROCEDURE — 83605 ASSAY OF LACTIC ACID: CPT

## 2021-02-22 PROCEDURE — 72125 CT NECK SPINE W/O DYE: CPT

## 2021-02-22 PROCEDURE — 70486 CT MAXILLOFACIAL W/O DYE: CPT

## 2021-02-22 PROCEDURE — 87040 BLOOD CULTURE FOR BACTERIA: CPT | Mod: 91

## 2021-02-22 PROCEDURE — 70450 CT HEAD/BRAIN W/O DYE: CPT

## 2021-02-22 PROCEDURE — 84484 ASSAY OF TROPONIN QUANT: CPT

## 2021-02-22 PROCEDURE — 700105 HCHG RX REV CODE 258: Performed by: EMERGENCY MEDICINE

## 2021-02-22 PROCEDURE — 80307 DRUG TEST PRSMV CHEM ANLYZR: CPT

## 2021-02-22 PROCEDURE — 71045 X-RAY EXAM CHEST 1 VIEW: CPT

## 2021-02-22 PROCEDURE — 83690 ASSAY OF LIPASE: CPT

## 2021-02-22 PROCEDURE — 99204 OFFICE O/P NEW MOD 45 MIN: CPT | Performed by: STUDENT IN AN ORGANIZED HEALTH CARE EDUCATION/TRAINING PROGRAM

## 2021-02-22 PROCEDURE — 36415 COLL VENOUS BLD VENIPUNCTURE: CPT

## 2021-02-22 PROCEDURE — 80053 COMPREHEN METABOLIC PANEL: CPT

## 2021-02-22 PROCEDURE — 93005 ELECTROCARDIOGRAM TRACING: CPT | Performed by: EMERGENCY MEDICINE

## 2021-02-22 PROCEDURE — G0378 HOSPITAL OBSERVATION PER HR: HCPCS

## 2021-02-22 PROCEDURE — U0005 INFEC AGEN DETEC AMPLI PROBE: HCPCS

## 2021-02-22 PROCEDURE — U0003 INFECTIOUS AGENT DETECTION BY NUCLEIC ACID (DNA OR RNA); SEVERE ACUTE RESPIRATORY SYNDROME CORONAVIRUS 2 (SARS-COV-2) (CORONAVIRUS DISEASE [COVID-19]), AMPLIFIED PROBE TECHNIQUE, MAKING USE OF HIGH THROUGHPUT TECHNOLOGIES AS DESCRIBED BY CMS-2020-01-R: HCPCS

## 2021-02-22 PROCEDURE — 85025 COMPLETE CBC W/AUTO DIFF WBC: CPT

## 2021-02-22 RX ORDER — SODIUM CHLORIDE 9 MG/ML
1000 INJECTION, SOLUTION INTRAVENOUS ONCE
Status: COMPLETED | OUTPATIENT
Start: 2021-02-22 | End: 2021-02-22

## 2021-02-22 RX ORDER — IBUPROFEN 200 MG
400 TABLET ORAL EVERY 6 HOURS PRN
COMMUNITY

## 2021-02-22 RX ORDER — METHYLPHENIDATE HYDROCHLORIDE 10 MG/1
10 TABLET ORAL 2 TIMES DAILY
COMMUNITY

## 2021-02-22 RX ADMIN — SODIUM CHLORIDE 1000 ML: 9 INJECTION, SOLUTION INTRAVENOUS at 12:41

## 2021-02-22 ASSESSMENT — ENCOUNTER SYMPTOMS
FEVER: 0
CHILLS: 0
NAUSEA: 0
BRUISES/BLEEDS EASILY: 0
DIZZINESS: 1
PALPITATIONS: 0
EYE PAIN: 0
SHORTNESS OF BREATH: 0
DEPRESSION: 0
MYALGIAS: 1
ABDOMINAL PAIN: 0
SORE THROAT: 0
WEIGHT LOSS: 1
LOSS OF CONSCIOUSNESS: 1
WEAKNESS: 0
HEADACHES: 0
NERVOUS/ANXIOUS: 0
VOMITING: 0
SINUS PAIN: 0
FALLS: 1

## 2021-02-22 NOTE — ED TRIAGE NOTES
"Chief Complaint   Patient presents with   • Syncope     Been feeling \"Badly\" since last night, passed out several times, sent by PCP for low blood pressure and fainting. Bruise to left eye     "

## 2021-02-22 NOTE — ED PROVIDER NOTES
"ED Provider Note  CHIEF COMPLAINT  Chief Complaint   Patient presents with   • Syncope     Been feeling \"Badly\" since last night, passed out several times, sent by PCP for low blood pressure and fainting. Bruise to left eye       [facility]  Bruno Baires is a 30 y.o. male who presents to the ER with several syncopal episodes since last night.  The patient said he \"started feeling badly\" last night.  He is passed out several times.  During one of his most recent syncopal episodes he hit his head.  He has a small bruise adjacent to his eye.  No visual changes.  No neck pain.  No headache.  No nausea or vomiting.  He denies any recent illnesses.  No fevers or chills.  No cough.  No palpitations, chest pain or shortness of breath prior to syncopal events.  He says his syncopal events occurred when he stood up.  He says he felt very lightheaded as though he would pass out prior to his syncopal events.  No belly pain.  No back pain.  No headaches.  He has never passed out before.  Patient admits to a 25 pound weight loss in the last 1 year.  He says he has been changing his diet and he has been purposefully losing weight.  He does not take any supplements.  He does not use any drugs.  He does not think he is had COVID-19 this year.  No myalgias.  No sore throat.  No runny nose.  No cough.  No black tarry stools.  No blood in his stool or urine.  He went to his primary care physician's office today to get checked for the syncopal episodes he was having.  His primary care physician noticed that his blood pressure was low and sent him to the ER for further evaluation.    REVIEW OF SYSTEMS  See HPI for further details. All other systems are negative.    PAST MEDICAL HISTORY  Past Medical History:   Diagnosis Date   • Anxiety    • Tremors of nervous system        FAMILY HISTORY  Family History   Problem Relation Age of Onset   • Hyperlipidemia Mother    • Hypertension Mother    • Cancer Maternal Grandmother         Breast   • " "Diabetes Maternal Grandmother        SOCIAL HISTORY  Social History     Socioeconomic History   • Marital status: Single     Spouse name: Not on file   • Number of children: Not on file   • Years of education: Not on file   • Highest education level: Not on file   Occupational History   • Not on file   Tobacco Use   • Smoking status: Never Smoker   • Smokeless tobacco: Never Used   Substance and Sexual Activity   • Alcohol use: Yes     Alcohol/week: 1.8 oz     Types: 3 Cans of beer per week   • Drug use: No   • Sexual activity: Not Currently   Other Topics Concern   • Not on file   Social History Narrative   • Not on file     Social Determinants of Health     Financial Resource Strain:    • Difficulty of Paying Living Expenses:    Food Insecurity:    • Worried About Running Out of Food in the Last Year:    • Ran Out of Food in the Last Year:    Transportation Needs:    • Lack of Transportation (Medical):    • Lack of Transportation (Non-Medical):    Physical Activity:    • Days of Exercise per Week:    • Minutes of Exercise per Session:    Stress:    • Feeling of Stress :    Social Connections:    • Frequency of Communication with Friends and Family:    • Frequency of Social Gatherings with Friends and Family:    • Attends [employer] Services:    • Active Member of Clubs or Organizations:    • Attends Club or Organization Meetings:    • Marital Status:    Intimate Partner Violence:    • Fear of Current or Ex-Partner:    • Emotionally Abused:    • Physically Abused:    • Sexually Abused:        SURGICAL HISTORY  Past Surgical History:   Procedure Laterality Date   • EYE SURGERY      PRK x2 in each eye       CURRENT MEDICATIONS  Home Medications    **Home medications have not yet been reviewed for this encounter**         ALLERGIES  No Known Allergies    PHYSICAL EXAM  VITAL SIGNS: /70   Pulse 75   Temp 36.7 °C (98 °F) (Temporal)   Resp 16   Ht 1.778 m (5' 10\")   Wt 71.3 kg (157 lb 3 oz)   SpO2 97%   BMI " 22.55 kg/m²      Constitutional: Well developed, well nourished; No acute distress; chronically ill-appearing.  Appears tired..   HENT: Normocephalic, atraumatic; Bilateral external ears normal; patient has some mild tenderness along the left side of his face and jaw.  There is a small area of ecchymosis just lateral to the left eye.  There is no subconjunctival hemorrhage.  Extraocular movements are intact.  Vision is normal.  Eyes: PERRL, EOMI, Conjunctiva normal. No discharge.   Neck:  Supple, there is some mild inconsistent midline tenderness.  There is some right-sided paraspinal muscle tenderness.; No stridor; No nuchal rigidity.   Lymphatic: No cervical lymphadenopathy noted.   Cardiovascular: Regular rate and rhythm without murmurs, rubs, or gallop.   Thorax & Lungs: No respiratory distress, breath sounds clear to auscultation bilaterally without wheezing, rales or rhonchi. Nontender chest wall. No crepitus or subcutaneous air  Abdomen: Soft, nontender, bowel sounds normal. No obvious masses; No pulsatile masses; no rebound, guarding, or peritoneal signs.   Skin: Pale, warm and dry without rash or petechia.  Back: Nontender, No CVA tenderness.   Extremities: Distal radial, dorsalis pedis, posterior tibial pulses are equal bilaterally; No edema; Nontender calves or saphenous, No cyanosis, No clubbing.   Musculoskeletal: Good range of motion in all major joints. No tenderness to palpation or major deformities noted.   Neurologic: Alert & oriented x 4, clear speech      EKG  EKG time 1028.  Normal sinus rhythm.  Rate of 66.  There is some less than 1 mm, minimal ST elevation in lead II, III and aVF.  There is no other ST elevation.  No reciprocal changes.  No ST depression.  T wave flat in aVL.    RADIOLOGY/PROCEDURES  CT-MAXILLOFACIAL W/O PLUS RECONS   Final Result         No acute maxillofacial fracture.      CT-HEAD W/O   Final Result         1. No acute intracranial abnormality. No evidence of acute  intracranial hemorrhage or mass lesion.               CT-CSPINE WITHOUT PLUS RECONS   Final Result         1. No acute fracture from C1 through T1 is visualized.         DX-CHEST-PORTABLE (1 VIEW)   Final Result      No acute cardiopulmonary findings.          COURSE & MEDICAL DECISION MAKING  Pertinent Labs & Imaging studies reviewed. (See chart for details)    Results for orders placed or performed during the hospital encounter of 02/22/21   CBC WITH DIFFERENTIAL   Result Value Ref Range    WBC 6.7 4.8 - 10.8 K/uL    RBC 5.26 4.70 - 6.10 M/uL    Hemoglobin 15.3 14.0 - 18.0 g/dL    Hematocrit 45.0 42.0 - 52.0 %    MCV 85.6 81.4 - 97.8 fL    MCH 29.1 27.0 - 33.0 pg    MCHC 34.0 33.7 - 35.3 g/dL    RDW 40.7 35.9 - 50.0 fL    Platelet Count 229 164 - 446 K/uL    MPV 9.4 9.0 - 12.9 fL    Neutrophils-Polys 56.80 44.00 - 72.00 %    Lymphocytes 24.00 22.00 - 41.00 %    Monocytes 9.80 0.00 - 13.40 %    Eosinophils 8.00 (H) 0.00 - 6.90 %    Basophils 1.30 0.00 - 1.80 %    Immature Granulocytes 0.10 0.00 - 0.90 %    Nucleated RBC 0.00 /100 WBC    Neutrophils (Absolute) 3.82 1.82 - 7.42 K/uL    Lymphs (Absolute) 1.62 1.00 - 4.80 K/uL    Monos (Absolute) 0.66 0.00 - 0.85 K/uL    Eos (Absolute) 0.54 (H) 0.00 - 0.51 K/uL    Baso (Absolute) 0.09 0.00 - 0.12 K/uL    Immature Granulocytes (abs) 0.01 0.00 - 0.11 K/uL    NRBC (Absolute) 0.00 K/uL   COMP METABOLIC PANEL   Result Value Ref Range    Sodium 137 135 - 145 mmol/L    Potassium 4.5 3.6 - 5.5 mmol/L    Chloride 100 96 - 112 mmol/L    Co2 26 20 - 33 mmol/L    Anion Gap 11.0 7.0 - 16.0    Glucose 105 (H) 65 - 99 mg/dL    Bun 14 8 - 22 mg/dL    Creatinine 0.82 0.50 - 1.40 mg/dL    Calcium 9.5 8.4 - 10.2 mg/dL    AST(SGOT) 17 12 - 45 U/L    ALT(SGPT) 9 2 - 50 U/L    Alkaline Phosphatase 64 30 - 99 U/L    Total Bilirubin 0.4 0.1 - 1.5 mg/dL    Albumin 4.6 3.2 - 4.9 g/dL    Total Protein 7.3 6.0 - 8.2 g/dL    Globulin 2.7 1.9 - 3.5 g/dL    A-G Ratio 1.7 g/dL   LIPASE   Result Value  Ref Range    Lipase 25 7 - 58 U/L   TROPONIN   Result Value Ref Range    Troponin T <6 6 - 19 ng/L   URINALYSIS (UA)    Specimen: Urine, Clean Catch   Result Value Ref Range    Color Yellow     Character Clear     Specific Gravity 1.015 <1.035    Ph 6.5 5.0 - 8.0    Glucose Negative Negative mg/dL    Ketones Negative Negative mg/dL    Protein Negative Negative mg/dL    Bilirubin Negative Negative    Nitrite Negative Negative    Leukocyte Esterase Negative Negative    Occult Blood Negative Negative    Micro Urine Req see below    LACTIC ACID   Result Value Ref Range    Lactic Acid 1.1 0.5 - 2.0 mmol/L   ESTIMATED GFR   Result Value Ref Range    GFR If African American >60 >60 mL/min/1.73 m 2    GFR If Non African American >60 >60 mL/min/1.73 m 2   SARS-CoV-2 PCR (24 hour In-House): Collect NP swab in [facility]    Specimen: Respirate   Result Value Ref Range    SARS-CoV-2 Source NP Swab     SARS-CoV-2 by PCR NotDetected    URINE DRUG SCREEN   Result Value Ref Range    Amphetamines Urine Negative Negative    Barbiturates Negative Negative    Benzodiazepines Negative Negative    Cocaine Metabolite Negative Negative    Methadone Negative Negative    Opiates Negative Negative    Oxycodone Negative Negative    Phencyclidine -Pcp Negative Negative    Propoxyphene Negative Negative    Cannabinoid Metab Negative Negative   CORTISOL   Result Value Ref Range    Cortisol 12.6 0.0 - 23.0 ug/dL   EKG (NOW)   Result Value Ref Range    Report       [facility] Emergency Dept[facility]    Test Date:  2021  Pt Name:    PRITI AGUILAR                 Department: [facility]  MRN:        [id number]                      [facility]  Gender:     Male                         Technician: [id number]  :        1990                   Requested By:TEJAS OBRIEN  Order #:    [id number]                    Bertha MD: Tejas Obrien    Measurements  Intervals                                Axis  Rate:       66                            "P:          46  NC:         143                          QRS:        77  QRSD:       85                           T:          61  QT:         374  QTc:        392    Interpretive Statements  Sinus rhythm rate of 66  Normal axis  Normal intervals  T wave inverted in aVL  No ST elevation or depression  No previous ECG available for comparison  Electronically Signed On 2- 12:20:36 PST by Adriana Obrien           Patient presents to the ER with several syncopal episodes since last night.  He said that last night he started \"feeling badly.\"  He passed out several times, with the last episode causing him to bump his head and his face.  He went to his primary care physician's office today and was found to have hypotension.  He was transferred here to the ER for further evaluation and management.  Here in the ER patient looks unwell.  He looks pale.  He looks a little weak.  He admits to not eating or drinking much lately.  We checked orthostatic vital signs and he was markedly orthostatic with his heart rate jumping up more than 30 and his blood pressure dropping down to 75 systolic.  He was given IV fluids.  He has a small bruise to the lateral aspect of his left eye.  He has no visual changes.  He complains of a little bit of pain to his left cheek.  Extraocular movements are intact.  No subconjunctival hemorrhage.  CT brain is negative for any acute bleed.  CT maxillofacial region and neck are negative as well for any acute injury.  EKG is nonacute.  Lab work is fairly unremarkable.  At this time the patient is markedly orthostatic, possibly causing his syncopal episodes.  The question is why is he still orthostatic.  There was concern over some adrenal issues that might be contributing.  Given patient's markedly low blood pressure of 75 systolic and has multiple syncopal episodes, I think it would be best to admit the patient to the hospital.  I spoke with Dr. Oseguera, hospitalist on-call, and he is kindly agreed to " "evaluate the patient for hospitalization.  Unfortunately, when Dr. Oseguera came down to see the patient, the patient and his mother decided that they wanted to leave.  The patient is a [place] citizen.  He does not have any insurance.  They are concerned about the cost of hospitalization.  They want to leave.  The mother states \"I know what is wrong with him.\"  She says that she believes he is passed out because he is not eating and drinking normally.  She says she has a blood pressure cuff at home and she would prefer to monitor it at home.  She does not want a pay for hospitalization.  She says they just live around the corner if he gets worse she will bring him back.  Dr. Oseguera and I were at the patient's bedside during this discussion.  Both Dr. Oseguera and I feel the patient needs to leave AMA.  Patient and his mother both agree to sign the AMA paperwork.  They understand the risks of leaving gets medical advice.  They verbalized their reasons for leaving and they are understanding that his blood pressure was extremely low and that further work-up needed to be done to try to delineate exactly what is causing the patient to pass out.  At this time patient will leave AMA.  AMA form has been signed and placed on the chart.  Please see Dr. Oseguera's consult note as he spoke with the patient about possibly ordering a cortisol level prior to discharge and then following up at that level himself and calling the patient with the result.    1230:  Discussed with Dr. Oseguera    1530: Nursing staff noticed that orders for bed were not placed by admitting physician.  I contacted Dr. Oseguera and he admitted he had forgotten to place the admission orders in the computer.    I verified that the patient was wearing a mask and I was wearing appropriate PPE every time I entered the room. The patient's mask was on the patient at all times during my encounter except for a brief view of the oropharynx.    The patient is leaving " against medical advice.  I discussed with the patient the risks of leaving without receiving appropriate care to include permanent disability or death.  I have discussed possible alternatives.  The patient is not intoxicated.  The patient is a capable decision maker and understands the risks and benefits of their decision.  While I certainly disagree with the patient's decision, I respect the patient's autonomy and will not keep them here against their will.  They understand that their decision to leave can be reversed at any time and they can return to us at any time for any reason at all.  Patient's mother is involved with the discussion and also understands my concern. I have asked the nurses to have the patient sign an AMA form and to witness this signature.      FINAL IMPRESSION  1. Orthostatic syncope Acute    AMA     This dictation has been created using voice recognition software. The accuracy of the dictation is limited by the abilities of the software. I expect there may be some errors of grammar and possibly content. I made every attempt to manually correct the errors within my dictation. However, errors related to voice recognition software may still exist and should be interpreted within the appropriate context.    Electronically signed by: Adriana Obrien M.D., 2/22/2021 10:31 AM

## 2021-02-23 NOTE — ED NOTES
Pt signed AMA, pt and mother aware of the risk of leaving.   .Patient d/c per MD order.  Pt states understanding of d.c instructions.  Pt given copies of instructions.  Belongings with pt.  VSS.  Pt ambulate to lobby with steady gait.

## 2021-02-23 NOTE — CONSULTS
Hospital Medicine Consultation    Date of Service  2/22/2021    Referring Physician  Adriana Obrien M.D.    Consulting Physician  Johnathon Oseguera M.D.    Reason for Consultation  Syncope    History of Presenting Illness  30 y.o. male who presented 2/22/2021 with syncopal episode.    Hospital medicine was consulted for admission. He indicated that he is a [place] citizen and does not have [place] and did not want to stay if it was not medically necessary.    He describes the syncopal episode as brief loss of consciousness when standing up without prodrome of palpitations, chest pain, dyspnea. He has been lightheaded when standing since yesterday but feels he has been adequately hydrating. He has intermittent N/V that precedes this syncopal episode. He reports unintentional weight loss of approximately 15 lbs in the past year. He denies bowel/bladder dysfunction, F/C.    In the ED underwent CT of head, face, C-spine for facial trauma that were negative for fracture. Orthostatics were floridly positive with BP in the 70s and tachycardia CXR was normal as was EKG. Urinalysis and UDS were negative. Electrolytes, CBC, and lactic acid were normal.    In review of the clinical picture, I recommended a STAT random cortisol to screen for adrenal insufficiency. He was agreeable to stay for the results as a value >16 would effectively exclude that diagnosis without a cosyntropin test, and he would not want to stay just for telemetry. I called lab and inquired about turnaround time. I was advised that the test is approximately 45 minutes but is 'd to [facility] every 2 hours.    In joint discussion with Dr. Obrien, myself, Mr. Baires, and his mother he decided to discharge TEN after being advised to stay for further workup. His mother indicated that she would be monitoring him at home and they were agreeable to return to the ED if ongoing hypotension, syncope, or falls.    Review of Systems  Review of Systems    Constitutional: Positive for weight loss. Negative for chills and fever.   HENT: Negative for ear pain, nosebleeds, sinus pain and sore throat.    Eyes: Negative for pain.   Respiratory: Negative for shortness of breath.    Cardiovascular: Negative for chest pain and palpitations.   Gastrointestinal: Negative for abdominal pain, nausea and vomiting.   Genitourinary: Negative for dysuria.   Musculoskeletal: Positive for falls and myalgias.   Neurological: Positive for dizziness and loss of consciousness. Negative for weakness and headaches.   Endo/Heme/Allergies: Does not bruise/bleed easily.   Psychiatric/Behavioral: Negative for depression. The patient is not nervous/anxious.        Past Medical History   has a past medical history of Anxiety and Tremors of nervous system.    Surgical History   has a past surgical history that includes eye surgery.    Family History  family history includes Cancer in his maternal grandmother; Diabetes in his maternal grandmother; Hyperlipidemia in his mother; Hypertension in his mother.    Social History   reports that he has never smoked. He has never used smokeless tobacco. He reports current alcohol use of about 1.8 oz of alcohol per week. He reports that he does not use drugs.    Medications  Prior to Admission Medications   Prescriptions Last Dose Informant Patient Reported? Taking?   escitalopram (LEXAPRO) 10 MG Tab 2/21/2021 at 1900 Patient No No   Sig: Take 1 Tab by mouth every day.   Patient taking differently: Take 10 mg by mouth every evening.   finasteride (PROSCAR) 5 MG Tab 2/21/2021 at 1900 Patient No No   Sig: Take 1 Tab by mouth every day.   Patient taking differently: Take 5 mg by mouth every evening.   ibuprofen (MOTRIN) 200 MG Tab >1 week at Unknown Patient Yes Yes   Sig: Take 400 mg by mouth every 6 hours as needed for Mild Pain.   methylphenidate (RITALIN) 10 MG Tab 2/21/2021 at 1200 Patient Yes Yes   Sig: Take 10 mg by mouth 2 times a day.   zolpidem  (AMBIEN) 10 MG Tab 2/20/2021 at PM Patient No No   Sig: TAKE ONE TABLET BY MOUTH EVERY NIGHT AT BEDTIME AS NEEDED FOR SLEEP FOR UP TO 90 DAYS   Patient taking differently: Take 10 mg by mouth every 48 hours.      Facility-Administered Medications: None       Allergies  No Known Allergies    Physical Exam  Temp:  [36.7 °C (98 °F)] 36.7 °C (98 °F)  Pulse:  [53-95] 95  Resp:  [16] 16  BP: ()/(48-70) 75/48  SpO2:  [95 %-97 %] 95 %    Physical Exam  Vitals and nursing note reviewed.   HENT:      Head: Normocephalic.   Eyes:      General: No scleral icterus.     Conjunctiva/sclera: Conjunctivae normal.   Cardiovascular:      Rate and Rhythm: Normal rate and regular rhythm.   Pulmonary:      Effort: Pulmonary effort is normal. No respiratory distress.   Abdominal:      General: Abdomen is flat.   Musculoskeletal:      Cervical back: Neck supple.   Skin:     General: Skin is dry.   Neurological:      Mental Status: He is alert.      Comments: Appropriately conversant   Psychiatric:         Mood and Affect: Mood normal.         Behavior: Behavior normal.         Thought Content: Thought content normal.         Judgment: Judgment normal.         Fluids      Laboratory  Recent Labs     02/22/21  1107   WBC 6.7   RBC 5.26   HEMOGLOBIN 15.3   HEMATOCRIT 45.0   MCV 85.6   MCH 29.1   MCHC 34.0   RDW 40.7   PLATELETCT 229   MPV 9.4     Recent Labs     02/22/21  1107   SODIUM 137   POTASSIUM 4.5   CHLORIDE 100   CO2 26   GLUCOSE 105*   BUN 14   CREATININE 0.82   CALCIUM 9.5                     Imaging  CT-MAXILLOFACIAL W/O PLUS RECONS   Final Result         No acute maxillofacial fracture.      CT-HEAD W/O   Final Result         1. No acute intracranial abnormality. No evidence of acute intracranial hemorrhage or mass lesion.               CT-CSPINE WITHOUT PLUS RECONS   Final Result         1. No acute fracture from C1 through T1 is visualized.         DX-CHEST-PORTABLE (1 VIEW)   Final Result      No acute cardiopulmonary  findings.          Assessment/Plan  Orthostatic syncope- (present on admission)  Assessment & Plan  Orthostatics positive in the ED  BMP, EKG, CXR  UA and UDS negative  Trauma workup negative for fracture  Random cortisol sent to screen for adrenal insufficiency given orthostasis, N/V, and unexplained weight loss  Random cortisol in progress, if <16 will call patient to advise need for cosyntropin test vs >16 excluding AI  Left AMA prior to admission for result and possible cosyntropin test if unable to r/o AI  Contact precautions provided in conjunction with ED provider, Mr. Baires, and his mother

## 2021-02-23 NOTE — ASSESSMENT & PLAN NOTE
Orthostatics positive in the ED  BMP, EKG, CXR  UA and UDS negative  Trauma workup negative for fracture  Random cortisol sent to screen for adrenal insufficiency given orthostasis, N/V, and unexplained weight loss  Random cortisol in progress, if <16 will call patient to advise need for cosyntropin test vs >16 excluding AI  Left AMA prior to admission for result and possible cosyntropin test if unable to r/o AI  Contact precautions provided in conjunction with ED provider, Mr. Baires, and his mother

## 2021-02-25 ENCOUNTER — HOSPITAL ENCOUNTER (OUTPATIENT)
Dept: LAB | Facility: MEDICAL CENTER | Age: 31
End: 2021-02-25
Attending: FAMILY MEDICINE
Payer: OTHER MISCELLANEOUS

## 2021-02-25 LAB — CORTIS SERPL-MCNC: 10.5 UG/DL (ref 0–23)

## 2021-02-25 PROCEDURE — 36415 COLL VENOUS BLD VENIPUNCTURE: CPT

## 2021-02-25 PROCEDURE — 82533 TOTAL CORTISOL: CPT

## 2021-02-27 LAB
BACTERIA BLD CULT: NORMAL
BACTERIA BLD CULT: NORMAL
SIGNIFICANT IND 70042: NORMAL
SIGNIFICANT IND 70042: NORMAL
SITE SITE: NORMAL
SITE SITE: NORMAL
SOURCE SOURCE: NORMAL
SOURCE SOURCE: NORMAL